# Patient Record
Sex: FEMALE | Race: WHITE | NOT HISPANIC OR LATINO | Employment: UNEMPLOYED | URBAN - METROPOLITAN AREA
[De-identification: names, ages, dates, MRNs, and addresses within clinical notes are randomized per-mention and may not be internally consistent; named-entity substitution may affect disease eponyms.]

---

## 2019-09-25 ENCOUNTER — HISTORICAL (OUTPATIENT)
Dept: ADMINISTRATIVE | Facility: HOSPITAL | Age: 34
End: 2019-09-25

## 2019-10-02 ENCOUNTER — HISTORICAL (OUTPATIENT)
Dept: SURGERY | Facility: HOSPITAL | Age: 34
End: 2019-10-02

## 2020-02-05 ENCOUNTER — HISTORICAL (OUTPATIENT)
Dept: SURGERY | Facility: HOSPITAL | Age: 35
End: 2020-02-05

## 2021-03-03 PROBLEM — J39.2 THORNWALDT'S CYST: Status: ACTIVE | Noted: 2021-03-03

## 2021-03-03 PROBLEM — M54.2 CERVICALGIA: Status: ACTIVE | Noted: 2021-03-03

## 2022-04-07 ENCOUNTER — HISTORICAL (OUTPATIENT)
Dept: ADMINISTRATIVE | Facility: HOSPITAL | Age: 37
End: 2022-04-07

## 2022-04-23 VITALS
SYSTOLIC BLOOD PRESSURE: 139 MMHG | OXYGEN SATURATION: 99 % | BODY MASS INDEX: 20.85 KG/M2 | HEIGHT: 62 IN | DIASTOLIC BLOOD PRESSURE: 90 MMHG | WEIGHT: 113.31 LBS

## 2022-04-30 NOTE — OP NOTE
DATE OF SURGERY:    02/05/2020    SURGEON:  Walt Mcneill MD    attending physician:  Walt Mcneill MD    PREOPERATIVE DIAGNOSIS:  Left carpal tunnel syndrome, left hand.    POSTOPERATIVE DIAGNOSIS:  Left carpal tunnel syndrome, left hand.    PROCEDURES:  Open carpal tunnel release, left hand.    INDICATION FOR PROCEDURE:  Stefani Iyer is 34-year-old female, with longstanding history of carpal tunnel syndrome, left hand numbness and tingling in the median nerve distribution, presents for transverse carpal ligament release.    ANESTHESIA:  Local.    COMPLICATIONS:  None.    PROCEDURE IN DETAIL:  The patient was placed supine on the operating room table and 1% lidocaine without epinephrine was instilled over the transverse carpal ligament.  An Esmarch was used to exsanguinate the left upper extremity.  Tourniquet was inflated to 250 mmHg.  A 15 blade scalpel was used to make a 2 cm incision of the volar palm through the fat and superficial palmar fascia.  Transverse carpal ligament was encountered and incised.  Tenotomy scissors were used to release in its entirety.  Several interrupted nylons were then placed to close the incision.  Sterile dressing was applied.  Tourniquet was released.  Fingers were pink at the end of the case.  No complications.  I was scrubbed and present for key portions of the procedure.        ______________________________  Walt Mcneill MD    BF/MODL  DD:  02/05/2020  Time:  08:10AM  DT:  02/05/2020  Time:  08:22AM  Job #:  [id number]

## 2022-04-30 NOTE — OP NOTE
DATE OF SURGERY:    10/02/2019    SURGEON:  Walt Mcneill MD    attending physician:  Walt Mcneill MD    PREOPERATIVE DIAGNOSIS:  Right carpal tunnel syndrome.    POSTOPERATIVE DIAGNOSIS:  Right carpal tunnel syndrome.    PROCEDURES:    1. Open carpal tunnel release, right hand.    2. Median nerve neuroplasty, right hand.    INDICATIONS FOR PROCEDURE:  Ms. Stefani Iyer is a 34-year-old female with longstanding history of carpal tunnel syndrome.  She presents for operative release.  She has median nerve symptoms that are worse at night.    ANESTHESIA:  LMA general.    COMPLICATIONS:  None.    PROCEDURE IN DETAIL:  The patient was placed under LMA anesthesia, prepped and draped in the usual sterile fashion.  Esmarch was used to exsanguinate the right upper extremity.  Tourniquet was inflated to 250 mmHg.  I made a 2 cm incision using a 15 blade scalpel over the volar palm.  The superficial palmar fascia was incised sharply down to the transverse carpal ligament.  This was incised using a 15 blade and then the Metzenbaum scissors were used to release the transverse carpal ligament in its entirety.  Nerve pass was performed using the scissors releasing the adhesions of the median nerve.  After this was completed, several interrupted nylons were placed to close the incisions.  Local was placed and the     tourniquet was dropped and the fingers were pink at the end of the case.  No complications.  Scrubbed and present for the entire procedure.      ______________________________  MD DOUG Muñiz/MODL  DD:  10/02/2019  Time:  08:50AM  DT:  10/02/2019  Time:  08:58AM  Job #:  [id number]

## 2022-05-01 NOTE — HISTORICAL [facility] CERNER
This is a historical note converted from [employer]. Formatting and pictures may have been removed.  Please reference [employer] for original formatting and attached multimedia. Chief Complaint  Bilateral Carpal Tunnel  History of Present Illness  This very nice 34-year-old female with bilateral carpal tunnel syndrome right worse than left. She presents for release. She has nocturnal symptoms of numbness and tingling in the median nerve distribution  Review of Systems  ??????Review ?Of Systems  ???????  ??????Constitutional symptoms: ?Negative except as documented in HPI,?  ??????Skin symptoms:??Negative except as documented in HPI  ??????Eye symptoms: ?Negative except as documented in HPI,?  ??????ENMT symptoms: ?Negative except as documented in HPI,  ??????Respiratory symptoms: ?Negative except as documented in HPI,  ??????Cardiovascular symptoms: ??Negative except as documented in HPI,  ??????Gastrointestinal symptoms:Negative except as documented in HPI,  ??????Genitourinary symptoms: ?Negative except as documented in HPI,  ??????Musculoskeletal symptoms: ?Negative except as documented in HPI,  ??????Neurologic symptoms:?Negative except as documented in HPI,  ??????Psychiatric symptoms: ?Negative except as documented in HPI  ??????Endocrine symptoms: ??Negative except as documented in HPI,  ??????Hematologic/Lymphatic symptoms: ??Negative except as documented in HPI,  ??????Alergy/immunologic symptoms: ??Negative except as documented in HPI,  ???????? ? ? ? ?Additional review of systems information:?All other systems reviewed and otherwise negative. Negative except as documented in HPI,  ???????  Physical Exam  ???Vitals & Measurements  ??T:?36.7? ?C (Oral)? HR:?94(Peripheral)? RR:?20? BP:?113/77? WT:?47.5?kg? BMI:?20.56?  Physical Examination?  ??????General: ?Alert and oriented, No acute distress. ?  ??????Eye: ?Pupils are equal, round and reactive to light. ?  ??????HENT: ?Normocephalic, Normal hearing, Oral mucosa  is moist. ?  ??????Neck: ?Supple, Non-tender. ?  ??????Respiratory: ?Respirations are non-labored, Symmetrical chest wall expansion. ?  ??????Cardiovascular: ?Normal rate, Regular rhythm. ?  ??????Gastrointestinal: ?Soft, Non-tender, Non-distended. ?  ???????Lymphatics: ?No lymphadenopathy neck, axilla, groin. ?  ??????Musculoskeletal: ?Normal range of motion, Normal strength, No tenderness, No swelling, No deformity, Normal gait  ??????Integumentary: ?Warm, Dry, Pink, Intact, No pallor. ?  ??????Neurologic: ?Alert, Oriented, Normal sensory, Normal motor function. ?  ??????Psychiatric: ?Cooperative, Appropriate mood & affect, Normal judgment. ?  ???????  ?  Assessment/Plan  1.?Carpal tunnel syndrome, right?G56.01  ???Noticed some?purulent?for mental?health physician on?an plan for open carpal tunnel release first available. October 2. Plan open carpal tunnel release Oct 2nd  ??Ordered:  Office/Outpatient Visit Level 4 [facility] [id number] , Carpal tunnel syndrome, right, [facility], 09/23/19 13:25:00 CDT  ? Problem List/Past Medical History  Ongoing  ??Asthma  ?Back pain  ?Tobacco user  Historical  ??No qualifying data  Procedure/Surgical History  ?right foot  tubal   ?  Medications  Inpatient  ??No active inpatient medications  Home  ??ACETAMINOPHEN COD 4 TABLET, 1 tab(s), Oral, BID,? ?Not Taking, Completed Rx  ??acetaminophen-hydrocodone 325 mg-5 mg oral tablet, 1 tab(s), Oral, q8hr  ??ALPRAZOLAM 1 MG TABLET, 1 mg= 1 tab(s), Oral, TID  ??azithromycin 250 mg oral tablet, Oral,? ?Not Taking, Completed Rx  ??baclofen 10 mg oral tablet, 10 mg= 1 tab(s), Oral, qPM  ??ciprofloxacin 0.3% ophthalmic solution, 2 drop(s), Eye-Both, q4hr,? ?Not Taking, Completed Rx  ??cyclobenzaprine 10 mg oral tablet, 10 mg= 1 tab(s), Oral, qPM,? ?Not Taking, Completed Rx  ??cyproheptadine 4 mg oral tablet, 4 mg= 1 tab(s), Oral, TID,? ?Not Taking, Completed Rx  ??DICLOFENAC SOD DR 75 MG TAB, 75 mg= 1 tab(s), Oral, BID,? ?Not Taking, Completed  Rx  ??gabapentin 100 mg oral capsule, 100 mg= 1 cap(s), Oral, TID,? ?Not taking  ??gabapentin 300 mg oral capsule, 300 mg= 1 cap(s), Oral, TID  ??ibuprofen 800 mg oral tablet, 800 mg= 1 tab(s), Oral, TID  ??Medrol 4 mg oral tablet, 1 packet(s), Oral, As Directed,? ?Not Taking, Completed Rx  ??methocarbamol 750 mg oral tablet, 1500 mg= 2 tab(s), Oral, TID,? ?Not Taking, Completed Rx  ??SERTRALINE HCL 50 MG TABS, 50 mg= 1 tab(s), Oral, Daily  ??SIMVASTATIN 40 MG TAB, 40 mg= 1 tab(s), Oral, qPM  Allergies  Flexeril  Zanaflex?(Hives)  Social History  Abuse/Neglect  ?No, 07/07/2019  Alcohol - Medium Risk, 04/21/2016  ?1-2 times per week, 08/04/2015  Employment/School  ?Employed, Activity level: Occasional physical work. Highest education level: High school. Operates hazardous equipment: No., 08/09/2016  Exercise  ?Self assessment: Good condition. Exercise type: Walking., 08/09/2016  Home/Environment  ?Lives with sister in law house. Living situation: Home/Independent. Alcohol abuse in household: No. Substance abuse in household: No. Smoker in household: Yes. Injuries/Abuse/Neglect in household: No. Feels unsafe at home: No. Family/Friends available for support: Yes. Concern for family members at home: No. Major illness in household: No. Financial concerns: Yes., 08/09/2016  Nutrition/Health  ?Regular, Wants to lose weight: No. Sleeping concerns: No. Feels highly stressed: No., 08/09/2016  Sexual  ?Gender Identity Identifies as female., 08/19/2019  ?Sexually active: No., 08/09/2016  Substance Use - Denies Substance Abuse, 08/04/2015  ?Never, 04/21/2016  Tobacco - High Risk, 04/21/2016  ?10 or more cigarettes (1/2 pack or more)/day in last 30 days, Cigarettes, No, 09/23/2019  Family History  ?Endometriosis: Mother.  ?Hypertension.: Father.  ?Renal failure syndrome.: Mother.   [1]  [1]?Admission H & P; Charito SHAIKH, Walt MARQUIS. 09/23/2019 13:27 CDT

## 2022-07-29 ENCOUNTER — HOSPITAL ENCOUNTER (EMERGENCY)
Facility: HOSPITAL | Age: 37
Discharge: PSYCHIATRIC OR SUBSTANCE USE FACILITY | End: 2022-07-30
Attending: EMERGENCY MEDICINE
Payer: MEDICAID

## 2022-07-29 VITALS
OXYGEN SATURATION: 99 % | DIASTOLIC BLOOD PRESSURE: 65 MMHG | HEIGHT: 63 IN | TEMPERATURE: 100 F | SYSTOLIC BLOOD PRESSURE: 105 MMHG | HEART RATE: 98 BPM | RESPIRATION RATE: 18 BRPM | BODY MASS INDEX: 21.26 KG/M2 | WEIGHT: 120 LBS

## 2022-07-29 DIAGNOSIS — F23 ACUTE PSYCHOSIS: Primary | ICD-10-CM

## 2022-07-29 LAB
ALBUMIN SERPL-MCNC: 4 GM/DL (ref 3.5–5)
ALBUMIN/GLOB SERPL: 1.2 RATIO (ref 1.1–2)
ALP SERPL-CCNC: 55 UNIT/L (ref 40–150)
ALT SERPL-CCNC: 12 UNIT/L (ref 0–55)
AMPHET UR QL SCN: POSITIVE
APAP SERPL-MCNC: <17.4 UG/ML (ref 17.4–30)
APPEARANCE UR: CLEAR
AST SERPL-CCNC: 14 UNIT/L (ref 5–34)
B-HCG SERPL QL: NEGATIVE
BACTERIA #/AREA URNS AUTO: ABNORMAL /HPF
BARBITURATE SCN PRESENT UR: NEGATIVE
BASOPHILS # BLD AUTO: 0.04 X10(3)/MCL (ref 0–0.2)
BASOPHILS NFR BLD AUTO: 0.7 %
BENZODIAZ UR QL SCN: NEGATIVE
BILIRUB UR QL STRIP.AUTO: NEGATIVE MG/DL
BILIRUBIN DIRECT+TOT PNL SERPL-MCNC: 0.5 MG/DL
BUN SERPL-MCNC: 8.6 MG/DL (ref 7–18.7)
CALCIUM SERPL-MCNC: 9.7 MG/DL (ref 8.4–10.2)
CANNABINOIDS UR QL SCN: POSITIVE
CHLORIDE SERPL-SCNC: 106 MMOL/L (ref 98–107)
CO2 SERPL-SCNC: 25 MMOL/L (ref 22–29)
COCAINE UR QL SCN: NEGATIVE
COLOR UR AUTO: ABNORMAL
CREAT SERPL-MCNC: 0.8 MG/DL (ref 0.55–1.02)
EOSINOPHIL # BLD AUTO: 0.03 X10(3)/MCL (ref 0–0.9)
EOSINOPHIL NFR BLD AUTO: 0.5 %
ERYTHROCYTE [DISTWIDTH] IN BLOOD BY AUTOMATED COUNT: 12.5 % (ref 11.5–17)
ETHANOL SERPL-MCNC: <10 MG/DL
FENTANYL UR QL SCN: NEGATIVE
GLOBULIN SER-MCNC: 3.3 GM/DL (ref 2.4–3.5)
GLUCOSE SERPL-MCNC: 106 MG/DL (ref 74–100)
GLUCOSE UR QL STRIP.AUTO: NEGATIVE MG/DL
HCT VFR BLD AUTO: 43.6 % (ref 37–47)
HGB BLD-MCNC: 14.1 GM/DL (ref 12–16)
IMM GRANULOCYTES # BLD AUTO: 0.02 X10(3)/MCL (ref 0–0.04)
IMM GRANULOCYTES NFR BLD AUTO: 0.3 %
KETONES UR QL STRIP.AUTO: ABNORMAL MG/DL
LEUKOCYTE ESTERASE UR QL STRIP.AUTO: ABNORMAL UNIT/L
LYMPHOCYTES # BLD AUTO: 1.46 X10(3)/MCL (ref 0.6–4.6)
LYMPHOCYTES NFR BLD AUTO: 25 %
MCH RBC QN AUTO: 31.9 PG (ref 27–31)
MCHC RBC AUTO-ENTMCNC: 32.3 MG/DL (ref 33–36)
MCV RBC AUTO: 98.6 FL (ref 80–94)
MDMA UR QL SCN: NEGATIVE
MONOCYTES # BLD AUTO: 0.54 X10(3)/MCL (ref 0.1–1.3)
MONOCYTES NFR BLD AUTO: 9.2 %
NEUTROPHILS # BLD AUTO: 3.8 X10(3)/MCL (ref 2.1–9.2)
NEUTROPHILS NFR BLD AUTO: 64.3 %
NITRITE UR QL STRIP.AUTO: NEGATIVE
NRBC BLD AUTO-RTO: 0 %
OPIATES UR QL SCN: NEGATIVE
PCP UR QL: NEGATIVE
PH UR STRIP.AUTO: 5.5 [PH]
PH UR: 5.5 [PH] (ref 3–11)
PLATELET # BLD AUTO: 359 X10(3)/MCL (ref 130–400)
PMV BLD AUTO: 10.2 FL (ref 7.4–10.4)
POTASSIUM SERPL-SCNC: 4.1 MMOL/L (ref 3.5–5.1)
PROT SERPL-MCNC: 7.3 GM/DL (ref 6.4–8.3)
PROT UR QL STRIP.AUTO: ABNORMAL MG/DL
RBC # BLD AUTO: 4.42 X10(6)/MCL (ref 4.2–5.4)
RBC #/AREA URNS AUTO: <5 /HPF
RBC UR QL AUTO: NEGATIVE UNIT/L
SARS-COV-2 RDRP RESP QL NAA+PROBE: NEGATIVE
SODIUM SERPL-SCNC: 140 MMOL/L (ref 136–145)
SP GR UR STRIP.AUTO: 1.02 (ref 1–1.03)
SPECIFIC GRAVITY, URINE AUTO (.000) (OHS): 1.02 (ref 1–1.03)
SQUAMOUS #/AREA URNS AUTO: <5 /HPF
TSH SERPL-ACNC: 0.96 UIU/ML (ref 0.35–4.94)
UROBILINOGEN UR STRIP-ACNC: 1 MG/DL
WBC # SPEC AUTO: 5.9 X10(3)/MCL (ref 4.5–11.5)
WBC #/AREA URNS AUTO: 10 /HPF

## 2022-07-29 PROCEDURE — 81025 URINE PREGNANCY TEST: CPT | Performed by: EMERGENCY MEDICINE

## 2022-07-29 PROCEDURE — 85025 COMPLETE CBC W/AUTO DIFF WBC: CPT | Performed by: EMERGENCY MEDICINE

## 2022-07-29 PROCEDURE — 63600175 PHARM REV CODE 636 W HCPCS: Performed by: EMERGENCY MEDICINE

## 2022-07-29 PROCEDURE — 25000003 PHARM REV CODE 250: Performed by: EMERGENCY MEDICINE

## 2022-07-29 PROCEDURE — 36415 COLL VENOUS BLD VENIPUNCTURE: CPT | Performed by: EMERGENCY MEDICINE

## 2022-07-29 PROCEDURE — 80143 DRUG ASSAY ACETAMINOPHEN: CPT | Performed by: EMERGENCY MEDICINE

## 2022-07-29 PROCEDURE — 80053 COMPREHEN METABOLIC PANEL: CPT | Performed by: EMERGENCY MEDICINE

## 2022-07-29 PROCEDURE — 99285 EMERGENCY DEPT VISIT HI MDM: CPT | Mod: 25

## 2022-07-29 PROCEDURE — 96372 THER/PROPH/DIAG INJ SC/IM: CPT | Performed by: EMERGENCY MEDICINE

## 2022-07-29 PROCEDURE — 87635 SARS-COV-2 COVID-19 AMP PRB: CPT | Performed by: EMERGENCY MEDICINE

## 2022-07-29 PROCEDURE — 81001 URINALYSIS AUTO W/SCOPE: CPT | Performed by: EMERGENCY MEDICINE

## 2022-07-29 PROCEDURE — 84443 ASSAY THYROID STIM HORMONE: CPT | Performed by: EMERGENCY MEDICINE

## 2022-07-29 PROCEDURE — 82077 ASSAY SPEC XCP UR&BREATH IA: CPT | Performed by: EMERGENCY MEDICINE

## 2022-07-29 PROCEDURE — 80307 DRUG TEST PRSMV CHEM ANLYZR: CPT | Performed by: EMERGENCY MEDICINE

## 2022-07-29 RX ORDER — LORAZEPAM 1 MG/1
2 TABLET ORAL EVERY 8 HOURS PRN
Status: DISCONTINUED | OUTPATIENT
Start: 2022-07-29 | End: 2022-07-30 | Stop reason: HOSPADM

## 2022-07-29 RX ORDER — LORAZEPAM 2 MG/ML
2 INJECTION INTRAMUSCULAR EVERY 4 HOURS PRN
Status: DISCONTINUED | OUTPATIENT
Start: 2022-07-29 | End: 2022-07-30 | Stop reason: HOSPADM

## 2022-07-29 RX ORDER — HALOPERIDOL 5 MG/ML
5 INJECTION INTRAMUSCULAR EVERY 4 HOURS PRN
Status: DISCONTINUED | OUTPATIENT
Start: 2022-07-29 | End: 2022-07-30 | Stop reason: HOSPADM

## 2022-07-29 RX ORDER — DIPHENHYDRAMINE HYDROCHLORIDE 50 MG/ML
50 INJECTION INTRAMUSCULAR; INTRAVENOUS EVERY 4 HOURS PRN
Status: DISCONTINUED | OUTPATIENT
Start: 2022-07-29 | End: 2022-07-30 | Stop reason: HOSPADM

## 2022-07-29 RX ADMIN — DIPHENHYDRAMINE HYDROCHLORIDE 50 MG: 50 INJECTION, SOLUTION INTRAMUSCULAR; INTRAVENOUS at 08:07

## 2022-07-29 RX ADMIN — HALOPERIDOL LACTATE 5 MG: 5 INJECTION, SOLUTION INTRAMUSCULAR at 08:07

## 2022-07-29 RX ADMIN — LORAZEPAM 2 MG: 1 TABLET ORAL at 01:07

## 2022-07-29 RX ADMIN — LORAZEPAM 2 MG: 2 INJECTION INTRAMUSCULAR; INTRAVENOUS at 08:07

## 2022-07-29 NOTE — ED PROVIDER NOTES
Encounter Date: 7/29/2022       History     Chief Complaint   Patient presents with    Psychiatric Evaluation     Pt here with LPD under OPC by mother; pt denies SI, denies HI; calm, cooperative     This patient presents an order of protective custody filled out by her mother.  They were protective custody asserts that the patient has not slept for the last 3 days.  She states that she told her mother that she feels like someone is watching her.  She also states that she refuses to get help.  The mother wrote that the patient also did threaten to harm herself when she told the patient that she needed to go for treatment.    The patient admits to not sleeping for 3 days, states she thinks it was because of something that she may have smoked.  States I just need to sleep .  She denies suicidal or homicidal ideations.  The patient did admit however that she threatened to hurt herself to her mother, but states that she was not serious.    The symptoms have been gradual, constant are moderate to severe and are currently present.  The patient states that she is not compliant with her previously prescribed depression medications.        Review of patient's allergies indicates:   Allergen Reactions    Cyclobenzaprine     Tizanidine      Other reaction(s): Hives     Past Medical History:   Diagnosis Date    Allergies     Anemia     Anxiety     Depression      Past Surgical History:   Procedure Laterality Date    TUBAL LIGATION       Family History   Problem Relation Age of Onset    Hyperlipidemia Mother     Kidney disease Mother     Hyperlipidemia Father     Hyperlipidemia Sister     Kidney disease Sister     Hyperlipidemia Brother     No Known Problems Maternal Aunt     No Known Problems Maternal Uncle     No Known Problems Paternal Aunt     No Known Problems Paternal Uncle     No Known Problems Maternal Grandmother     No Known Problems Maternal Grandfather     No Known Problems Paternal Grandmother      No Known Problems Paternal Grandfather     Aneurysm Neg Hx     Cancer Neg Hx     Clotting disorder Neg Hx     Dementia Neg Hx     Diabetes Neg Hx     Fainting Neg Hx     Heart disease Neg Hx     Liver disease Neg Hx     Migraines Neg Hx     Neuropathy Neg Hx     Obesity Neg Hx     Parkinsonism Neg Hx     Seizures Neg Hx     Stroke Neg Hx     Tremor Neg Hx      Social History     Tobacco Use    Smoking status: Current Every Day Smoker     Types: Cigarettes    Smokeless tobacco: Never Used   Substance Use Topics    Alcohol use: Never    Drug use: Never     Review of Systems   Constitutional: Negative for chills, fatigue and fever.   HENT: Negative for congestion and sore throat.    Eyes: Negative for visual disturbance.   Respiratory: Negative for cough and shortness of breath.    Cardiovascular: Negative for chest pain.   Gastrointestinal: Negative for abdominal pain, diarrhea, nausea and vomiting.   Genitourinary: Negative for dysuria.   Musculoskeletal: Negative for myalgias.   Skin: Negative for rash.   Neurological: Negative for weakness, numbness and headaches.   All other systems reviewed and are negative.      Physical Exam     Initial Vitals [07/29/22 1229]   BP Pulse Resp Temp SpO2   117/89 (!) 148 18 97.5 °F (36.4 °C) 100 %      MAP       --         Physical Exam    Nursing note and vitals reviewed.  Constitutional: She appears well-developed and well-nourished.   HENT:   Head: Normocephalic and atraumatic.   Mouth/Throat: Oropharynx is clear and moist.   Eyes: Pupils are equal, round, and reactive to light.   Neck: Neck supple.   Normal range of motion.  Cardiovascular: Normal rate, regular rhythm, normal heart sounds and intact distal pulses.   Pulmonary/Chest: Breath sounds normal.   Abdominal: Abdomen is soft. Bowel sounds are normal. There is no abdominal tenderness. There is no rebound.   Musculoskeletal:         General: No tenderness. Normal range of motion.      Cervical  back: Normal range of motion and neck supple.     Neurological: She is alert and oriented to person, place, and time. She has normal strength. No sensory deficit. GCS score is 15. GCS eye subscore is 4. GCS verbal subscore is 5. GCS motor subscore is 6.   Skin: Skin is warm and dry.   Psychiatric: Her behavior is normal.   Patient with flat, depressed affect         ED Course   Procedures  Labs Reviewed   COMPREHENSIVE METABOLIC PANEL - Abnormal; Notable for the following components:       Result Value    Glucose Level 106 (*)     All other components within normal limits   URINALYSIS, REFLEX TO URINE CULTURE - Abnormal; Notable for the following components:    Color, UA Dark Yellow (*)     Protein, UA Trace (*)     Ketones, UA Trace (*)     Leukocyte Esterase, UA 1+ (*)     All other components within normal limits   DRUG SCREEN, URINE (BEAKER) - Abnormal; Notable for the following components:    Amphetamines, Urine Positive (*)     Cannabinoids, Urine Positive (*)     All other components within normal limits    Narrative:     Cut off concentrations:    Amphetamines - 1000 ng/ml  Barbiturates - 200 ng/ml  Benzodiazepine - 200 ng/ml  Cannabinoids (THC) - 50 ng/ml  Cocaine - 300 ng/ml  Fentanyl - 1.0 ng/ml  MDMA - 500 ng/ml  Opiates - 300 ng/ml   Phencyclidine (PCP) - 25 ng/ml    Specimen submitted for drug analysis and tested for pH and specific gravity in order to evaluate sample integrity. Suspect tampering if specific gravity is <1.003 and/or pH is not within the range of 4.5 - 8.0  False negatives may result form substances such as bleach added to urine.  False positives may result for the presence of a substance with similar chemical structure to the drug or its metabolite.    This test provides only a PRELIMINARY analytical test result. A more specific alternate chemical method must be used in order to obtain a confirmed analytical result. Gas chromatography/mass spectrometry (GC/MS) is the preferred  confirmatory method. Other chemical confirmation methods are available. Clinical consideration and professional judgement should be applied to any drug of abuse test result, particularly when preliminary positive results are used.    Positive results will be confirmed only at the physicians request. Unconfirmed screening results are to be used only for medical purposes (treatment).        ACETAMINOPHEN LEVEL - Abnormal; Notable for the following components:    Acetaminophen Level <17.4 (*)     All other components within normal limits   CBC WITH DIFFERENTIAL - Abnormal; Notable for the following components:    MCV 98.6 (*)     MCH 31.9 (*)     MCHC 32.3 (*)     All other components within normal limits   URINALYSIS, MICROSCOPIC - Abnormal; Notable for the following components:    WBC, UA 10 (*)     All other components within normal limits   TSH - Normal   ALCOHOL,MEDICAL (ETHANOL) - Normal   PREGNANCY TEST, URINE RAPID - Normal   SARS-COV-2 RNA AMPLIFICATION, QUAL - Normal   CBC W/ AUTO DIFFERENTIAL    Narrative:     The following orders were created for panel order CBC auto differential.  Procedure                               Abnormality         Status                     ---------                               -----------         ------                     CBC with Differential[[id number]]        Abnormal            Final result                 Please view results for these tests on the individual orders.          Imaging Results    None          Medications   LORazepam tablet 2 mg (2 mg Oral Given 7/29/22 1330)     Medical Decision Making:   Differential Diagnosis:   Acute psychosis, acute ryan, substance abuse, thyroid disorder  ED Management:  Patient admits to all of the behaviors, and even the threats that her mother wrote in the order of protective custody.  Patient will be placed on a physician's Emergency certificate, medically cleared for psychiatric evaluation                      Clinical Impression:    Final diagnoses:  [F23] Acute psychosis (Primary)          ED Disposition Condition    Transfer to Psych Facility         ED Prescriptions     None        Follow-up Information    None          Mirza Pak MD  07/29/22 [id number]